# Patient Record
Sex: FEMALE | Race: WHITE | NOT HISPANIC OR LATINO
[De-identification: names, ages, dates, MRNs, and addresses within clinical notes are randomized per-mention and may not be internally consistent; named-entity substitution may affect disease eponyms.]

---

## 2023-08-23 PROBLEM — Z00.00 ENCOUNTER FOR PREVENTIVE HEALTH EXAMINATION: Status: ACTIVE | Noted: 2023-08-23

## 2023-08-31 ENCOUNTER — APPOINTMENT (OUTPATIENT)
Dept: ORTHOPEDIC SURGERY | Facility: CLINIC | Age: 83
End: 2023-08-31
Payer: MEDICARE

## 2023-08-31 VITALS — BODY MASS INDEX: 25.58 KG/M2 | WEIGHT: 163 LBS | HEIGHT: 67 IN

## 2023-08-31 DIAGNOSIS — Z78.9 OTHER SPECIFIED HEALTH STATUS: ICD-10-CM

## 2023-08-31 PROCEDURE — 99024 POSTOP FOLLOW-UP VISIT: CPT

## 2023-08-31 PROCEDURE — 73503 X-RAY EXAM HIP UNI 4/> VIEWS: CPT | Mod: LT

## 2023-08-31 NOTE — REASON FOR VISIT
[FreeTextEntry2] : S/P left hip fracture repair on 8/13/23 after falling and being seen at [facility] ER

## 2023-08-31 NOTE — PHYSICAL EXAM
[2+] : posterior tibialis pulse: 2+ [] : uses walker [FreeTextEntry9] : ROM of lower extremity grossly intact [de-identified] : Deferred

## 2023-08-31 NOTE — ASSESSMENT
[FreeTextEntry1] : Dressing removed in the office today  WBAT  Continue to use compression stockings to control swelling  Explained to patient that she should buy coated OTC ASA 325mg and take to prevent blood clots   Recommend: - rest - ice - compression - elevation  Follow up in 2 months

## 2023-08-31 NOTE — IMAGING
[Left] : left hip with pelvis [AP] : anteroposterior [Lateral] : lateral [The fracture is in acceptable alignment. There is progression in healing seen] : The fracture is in acceptable alignment. There is progression in healing seen [Components well fixed, in good position] : Components well fixed, in good position

## 2023-08-31 NOTE — HISTORY OF PRESENT ILLNESS
[Sudden] : sudden [2] : 2 [Meds] : meds [Retired] : Work status: retired [de-identified] : S/P left hip fracture repair on 8/13/23 after falling and being seen at [facility] ER. Patient admits to having 1 home PT session so far. Patient states her pain is in her inner thigh. Patient admits to taking Tylenol PRN for pain. Patient is using a walker for ambulation.   Today's Pain 2/10 [] : no [FreeTextEntry1] : left hip

## 2023-10-26 ENCOUNTER — APPOINTMENT (OUTPATIENT)
Dept: ORTHOPEDIC SURGERY | Facility: CLINIC | Age: 83
End: 2023-10-26
Payer: MEDICARE

## 2023-10-26 VITALS — HEIGHT: 67 IN | BODY MASS INDEX: 25.58 KG/M2 | WEIGHT: 163 LBS

## 2023-10-26 PROCEDURE — 73503 X-RAY EXAM HIP UNI 4/> VIEWS: CPT | Mod: LT

## 2023-10-26 PROCEDURE — 99024 POSTOP FOLLOW-UP VISIT: CPT

## 2024-01-04 ENCOUNTER — APPOINTMENT (OUTPATIENT)
Dept: ORTHOPEDIC SURGERY | Facility: CLINIC | Age: 84
End: 2024-01-04
Payer: MEDICARE

## 2024-01-04 PROCEDURE — 73503 X-RAY EXAM HIP UNI 4/> VIEWS: CPT | Mod: LT

## 2024-01-04 PROCEDURE — 99213 OFFICE O/P EST LOW 20 MIN: CPT

## 2024-01-04 NOTE — PHYSICAL EXAM
[2+] : posterior tibialis pulse: 2+ [Left] : left hip [] : no focal motor deficits [FreeTextEntry9] : Abd 45, Flexion 40, extension 20   [de-identified] : Deferred [de-identified] : Cane on right

## 2024-01-04 NOTE — REASON FOR VISIT
[FreeTextEntry2] : S/P left hip fracture repair on 8/13/23 after falling and being seen at [facility] ER

## 2024-01-04 NOTE — IMAGING
[Left] : left hip with pelvis [AP] : anteroposterior [Lateral] : lateral [FreeTextEntry9] : No loosening of hardware, WF/WA

## 2024-01-04 NOTE — ASSESSMENT
[FreeTextEntry1] : Patient presents today for follow up on her left hip, S/P left hip fracture repair on 8/13/23. Patient doing well at this time, ambulates with cane. Minimal pain at this time. X-ray shows good bone formation, no loosening of hardware.   - Continue with home stretching and strengthening  - Recommend NSAIDs PRN  - Recommend rest, ice, compression, elevation - Recommend activity modification, avoid strenuous or high impact activities  Patient was educated on their diagnosis today. Emphasized the importance of gentle stretching and strengthening. Patient expressed understanding and all questions were answered today.    Follow up in 2 months for repeat x-rays

## 2024-03-07 ENCOUNTER — APPOINTMENT (OUTPATIENT)
Dept: ORTHOPEDIC SURGERY | Facility: CLINIC | Age: 84
End: 2024-03-07
Payer: MEDICARE

## 2024-03-07 DIAGNOSIS — S72.145A NONDISPLACED INTERTROCHANTERIC FRACTURE OF LEFT FEMUR, INITIAL ENCOUNTER FOR CLOSED FRACTURE: ICD-10-CM

## 2024-03-07 PROCEDURE — 99213 OFFICE O/P EST LOW 20 MIN: CPT

## 2024-03-07 PROCEDURE — 73503 X-RAY EXAM HIP UNI 4/> VIEWS: CPT | Mod: LT

## 2024-03-07 NOTE — PHYSICAL EXAM
[Left] : left hip [2+] : posterior tibialis pulse: 2+ [] : no focal motor deficits [5___] : adduction 5[unfilled]/5 [de-identified] : Cane on right [FreeTextEntry9] : Abd 60, Flexion 40, extension 30

## 2024-03-07 NOTE — ASSESSMENT
[FreeTextEntry1] : Patient presents today for follow up on her left hip, S/P left hip fracture repair on 8/13/23. Patient doing well at this time, ambulates with cane. Minimal pain at this time. X-ray shows robust bone formation, no loosening of hardware.   - Continue with home stretching and strengthening  - Recommend NSAIDs PRN  - Recommend rest, ice, compression, elevation - Recommend activity modification, avoid strenuous or high impact activities  Patient was educated on their diagnosis today. Emphasized the importance of gentle stretching and strengthening. Patient expressed understanding and all questions were answered today.    Follow up PRN

## 2024-03-07 NOTE — IMAGING
[Left] : left hip with pelvis [AP] : anteroposterior [Lateral] : lateral [No loss of surgical correlation. Bony alignment acceptable. Hardware in appropriate position] : No loss of surgical correlation. Bony alignment acceptable. Hardware in appropriate position

## 2024-03-07 NOTE — HISTORY OF PRESENT ILLNESS
[Sudden] : sudden [2] : 2 [Meds] : meds [Retired] : Work status: retired [de-identified] : S/P left hip fracture repair on 8/13/23. Patient states she has intermittent discomfort. Patient admits to doing HEP. Patient denies taking pain medication. Patient is using a cane for ambulation.  Today's Pain 1/10 [FreeTextEntry1] : left hip [] : no

## 2024-03-07 NOTE — REASON FOR VISIT
[FreeTextEntry2] : S/P left hip fracture repair on 8/13/23 after falling and being seen at [facility] ER

## 2024-07-23 ENCOUNTER — APPOINTMENT (OUTPATIENT)
Dept: ORTHOPEDIC SURGERY | Facility: CLINIC | Age: 84
End: 2024-07-23

## 2024-07-23 VITALS — HEIGHT: 67 IN | WEIGHT: 163 LBS | BODY MASS INDEX: 25.58 KG/M2

## 2024-07-23 PROCEDURE — 73060 X-RAY EXAM OF HUMERUS: CPT | Mod: LT

## 2024-07-23 PROCEDURE — 99213 OFFICE O/P EST LOW 20 MIN: CPT

## 2024-07-23 PROCEDURE — 99203 OFFICE O/P NEW LOW 30 MIN: CPT

## 2024-07-29 ENCOUNTER — APPOINTMENT (OUTPATIENT)
Dept: ORTHOPEDIC SURGERY | Facility: CLINIC | Age: 84
End: 2024-07-29

## 2024-07-29 DIAGNOSIS — S32.592A OTHER SPECIFIED FRACTURE OF LEFT PUBIS, INITIAL ENCOUNTER FOR CLOSED FRACTURE: ICD-10-CM

## 2024-07-29 DIAGNOSIS — S42.202A UNSPECIFIED FRACTURE OF UPPER END OF LEFT HUMERUS, INITIAL ENCOUNTER FOR CLOSED FRACTURE: ICD-10-CM

## 2024-07-29 PROCEDURE — 99213 OFFICE O/P EST LOW 20 MIN: CPT | Mod: 24

## 2024-07-29 PROCEDURE — 73030 X-RAY EXAM OF SHOULDER: CPT | Mod: 26,LT

## 2024-07-29 NOTE — IMAGING
[Left] : left hip with pelvis [All Views] : anteroposterior, lateral [de-identified] : (Exam: Shoulder)   Laterality is left   Patient is in no acute distress, alert and oriented Sensation is grossly intact to light touch in the hand Motor function is 5/5 in the hand Capillary refill is less than 2 seconds in the fingers Lymphadenopathy is not present Peripheral edema is not present   Skin is intact Swelling is present Atrophy is not present Scapular winging is not present Deformity of the AC joint is not present Deformity of the biceps is not present   Proximal humerus tenderness is present Bicipital groove tenderness is present AC joint tenderness is not present Scapulothoracic tenderness is not present   Active forward elevation is 30 Passive forward elevation is 60 External rotation at the side is 20 Internal rotation behind the back is to the level of side pocket   Forward elevation strength is 3/5 External rotation strength at the side is 3/5 Internal rotation strength at the side is 3/5 Deltoid anterior, posterior and lateral heads are firing  Special tests are deferred    [FreeTextEntry1] : comminuted displaced four part proximal humerus fracture, no dislocation [FreeTextEntry9] : ER imaging shows displaced superior left pubic rami fracture with impacted sacrum fracture, LC1 injury

## 2024-07-29 NOTE — DISCUSSION/SUMMARY
[de-identified] :  History, clinical examination and imaging were most consistent with: -left shoulder closed proximal humerus fracture -left pubic rami and sacral impaction fracture  The diagnosis was explained in detail. The potential non-surgical and surgical treatments were reviewed. The relative risks and benefits of each option were considered relative to the patients age, activity level, medical history, symptom severity and previously attempted treatments.   The patient was advised to consult with their primary medical provider prior to initiation of any new medications to reduce the risk of adverse effects specific to their long-term home medications and medical history. The risk of gastrointestinal irritation and kidney injury specific to long-term NSAID use was discussed.   For the shoulder: -Discussed non-surgical vs surgical treatment for this problem with RSA. Discussed the risk of non-union, malunion and post-traumatic osteoarthritis with non-surgical treatment, as well as the risk of stiffness. Discussed the risks of surgical intervention. -Patient wishes to continue non-surgical treatment at this time. -Continue sling for 2 more weeks.  -Discussed that if function was not satisfactory after a trial of non-surgical management, reverse shoulder arthroplasty could be revisited. -Patient will proceed with formal PT. -Over the counter acetaminophen for pain, take as needed. -Follow up in 1 month with XR.   For the pelvis: -Recommend non-surgical treatment. -Weight bearing as tolerated. -Script provided for PT. -Follow up as needed.    (MDM)   Problem Complexity -Moderate: acute, complicated injury   Risk -Low: over the counter medication

## 2024-08-04 PROBLEM — S49.90XA SHOULDER INJURY: Status: ACTIVE | Noted: 2024-08-04

## 2024-08-04 NOTE — IMAGING
[de-identified] : Left shoulder with ecchymosis, skin intact. Able to make fist, oppose thumb to small finger and abduct fingers. Sensation intact throughout. <2sec cap refill.  Left shoulder radiographs with displaced proximal humerus fracture.

## 2024-08-04 NOTE — HISTORY OF PRESENT ILLNESS
[de-identified] : Age: 84 year F PMHx: none Hand Dominance: RHD Chief Complaint: Left humerus pain s/p trauma 06/30/24. Patient reports that she had a mechanical fall ib 06/30/24 where she slipped on a golf ball, causing her to land onto her left side. Patient was seen at [facility] 07/05/24 where she had radiographs performed that confirmed the fracture in her left humerus and pelvis. Currently attending PT and OT everyday with relief. Denies numbness/tingling.    Trauma: 06/30/24 Outside Imaging/Treatment: radiographs from [facility] 07/05/24 OTC Medications: Tylenol PRN with relief OT/PT: none Bracing: arm in sling  Pain worse with: movement Pain better with: rest

## 2024-08-04 NOTE — IMAGING
[de-identified] : Left shoulder with ecchymosis, skin intact. Able to make fist, oppose thumb to small finger and abduct fingers. Sensation intact throughout. <2sec cap refill.  Left shoulder radiographs with displaced proximal humerus fracture.

## 2024-08-04 NOTE — ASSESSMENT
[FreeTextEntry1] : Left proximal humerus fracture, displaced - reviewed radiographs and overall pathoanatomy. In light of displaced, discussed operative intervention may be indicated in form of ORIF vs arthroplasty. Remain NWB in sling. NSAIDs prn.  F/u with Dr. Rebolledo

## 2024-08-04 NOTE — HISTORY OF PRESENT ILLNESS
[de-identified] : Age: 84 year F PMHx: none Hand Dominance: RHD Chief Complaint: Left humerus pain s/p trauma 06/30/24. Patient reports that she had a mechanical fall ib 06/30/24 where she slipped on a golf ball, causing her to land onto her left side. Patient was seen at [facility] 07/05/24 where she had radiographs performed that confirmed the fracture in her left humerus and pelvis. Currently attending PT and OT everyday with relief. Denies numbness/tingling.    Trauma: 06/30/24 Outside Imaging/Treatment: radiographs from [facility] 07/05/24 OTC Medications: Tylenol PRN with relief OT/PT: none Bracing: arm in sling  Pain worse with: movement Pain better with: rest

## 2024-08-30 ENCOUNTER — APPOINTMENT (OUTPATIENT)
Dept: ORTHOPEDIC SURGERY | Facility: CLINIC | Age: 84
End: 2024-08-30

## 2024-08-30 DIAGNOSIS — S42.202A UNSPECIFIED FRACTURE OF UPPER END OF LEFT HUMERUS, INITIAL ENCOUNTER FOR CLOSED FRACTURE: ICD-10-CM

## 2024-08-30 PROCEDURE — 99024 POSTOP FOLLOW-UP VISIT: CPT

## 2024-08-30 PROCEDURE — 73030 X-RAY EXAM OF SHOULDER: CPT | Mod: LT

## 2024-08-30 NOTE — DISCUSSION/SUMMARY
[de-identified] :  History, clinical examination and imaging were most consistent with: -left shoulder closed proximal humerus fracture   The diagnosis was explained in detail. The potential non-surgical and surgical treatments were reviewed. The relative risks and benefits of each option were considered relative to the patients age, activity level, medical history, symptom severity and previously attempted treatments.   The patient was advised to consult with their primary medical provider prior to initiation of any new medications to reduce the risk of adverse effects specific to their long-term home medications and medical history. The risk of gastrointestinal irritation and kidney injury specific to long-term NSAID use was discussed.   -Patient wishes to continue non-surgical treatment at this time. -Continue PT for stretching and strengthening. -Discontinue sling. -Discussed imaging findings and risk of non-union, malunion and post-traumatic osteoarthritis. -Discussed that if function was not satisfactory after a trial of non-surgical management, reverse shoulder arthroplasty could be revisited. -Over the counter acetaminophen for pain, take as needed. -Pelvis symptoms are resolved, no further treatment at this time.  -Follow up in 6 weeks with XR. Consider CSI if stiffness persists.  (MDM)   Problem Complexity -Moderate: acute, complicated injury   Risk -Low: over the counter medication

## 2024-08-30 NOTE — IMAGING
[Left] : left hip with pelvis [All Views] : anteroposterior, lateral [FreeTextEntry9] : ER imaging shows displaced superior left pubic rami fracture with impacted sacrum fracture, LC1 injury [de-identified] : (Exam: Shoulder)   Laterality is left   Patient is in no acute distress, alert and oriented Sensation is grossly intact to light touch in the hand Motor function is 5/5 in the hand Capillary refill is less than 2 seconds in the fingers Lymphadenopathy is not present Peripheral edema is not present   Skin is intact Swelling is present Atrophy is not present Scapular winging is not present Deformity of the AC joint is not present Deformity of the biceps is not present   Proximal humerus tenderness is present Bicipital groove tenderness is present AC joint tenderness is not present Scapulothoracic tenderness is not present   Active forward elevation is 70 Passive forward elevation is 90 External rotation at the side is 20 Internal rotation behind the back is to the level of side pocket   Forward elevation strength is 3/5 External rotation strength at the side is 3/5 Internal rotation strength at the side is 3/5 Deltoid anterior, posterior and lateral heads are firing  Special tests are deferred    [FreeTextEntry1] : comminuted displaced four part proximal humerus fracture, incomplete union is noted. no interval displacement

## 2024-08-30 NOTE — HISTORY OF PRESENT ILLNESS
[de-identified] : 08/30/2024: f/u for left shoulder and pelvis.  Pelvis symptoms have resolved completely.  Discontinued shoulder sling.  In PT for 3 weeks with improvement in symptoms. Taking Tylenol prn.    Date of initial evaluation is 07/29/2024 Patient age is 84 year  Occupation is retired Body part causing symptoms is the left shoulder Symptoms began 06/30/24, she slipped on a golf ball  Location of pain is lateral Quality of pain is dull Pain score at rest is 0/10 Pain score during activity is 6/10 Radicular symptoms are not present Prior treatments include Tylenol and sling Patient's condition is not associated with workers compensation, no-fault or interscholastic athletics  Patient has secondary complaint of pelvic pain  During the fall she had a pubic rami fracture Ambulating without device Pain slowly improving

## 2024-10-11 ENCOUNTER — APPOINTMENT (OUTPATIENT)
Dept: ORTHOPEDIC SURGERY | Facility: CLINIC | Age: 84
End: 2024-10-11
Payer: MEDICARE

## 2024-10-11 DIAGNOSIS — S42.202A UNSPECIFIED FRACTURE OF UPPER END OF LEFT HUMERUS, INITIAL ENCOUNTER FOR CLOSED FRACTURE: ICD-10-CM

## 2024-10-11 PROCEDURE — 73030 X-RAY EXAM OF SHOULDER: CPT | Mod: LT

## 2024-10-11 PROCEDURE — 99213 OFFICE O/P EST LOW 20 MIN: CPT

## 2024-12-13 ENCOUNTER — APPOINTMENT (OUTPATIENT)
Dept: ORTHOPEDIC SURGERY | Facility: CLINIC | Age: 84
End: 2024-12-13